# Patient Record
Sex: FEMALE | Race: OTHER | HISPANIC OR LATINO | Employment: UNEMPLOYED | URBAN - NONMETROPOLITAN AREA
[De-identification: names, ages, dates, MRNs, and addresses within clinical notes are randomized per-mention and may not be internally consistent; named-entity substitution may affect disease eponyms.]

---

## 2022-08-19 ENCOUNTER — HOSPITAL ENCOUNTER (EMERGENCY)
Facility: HOSPITAL | Age: 52
Discharge: HOME | End: 2022-08-19
Attending: STUDENT IN AN ORGANIZED HEALTH CARE EDUCATION/TRAINING PROGRAM | Admitting: STUDENT IN AN ORGANIZED HEALTH CARE EDUCATION/TRAINING PROGRAM

## 2022-08-19 VITALS
RESPIRATION RATE: 18 BRPM | DIASTOLIC BLOOD PRESSURE: 79 MMHG | HEART RATE: 74 BPM | SYSTOLIC BLOOD PRESSURE: 151 MMHG | OXYGEN SATURATION: 97 % | TEMPERATURE: 97.2 F

## 2022-08-19 DIAGNOSIS — M54.2 NECK PAIN: Primary | ICD-10-CM

## 2022-08-19 DIAGNOSIS — M62.838 NECK MUSCLE SPASM: ICD-10-CM

## 2022-08-19 PROCEDURE — 99282 EMERGENCY DEPT VISIT SF MDM: CPT

## 2022-08-19 PROCEDURE — 99284 EMERGENCY DEPT VISIT MOD MDM: CPT | Performed by: STUDENT IN AN ORGANIZED HEALTH CARE EDUCATION/TRAINING PROGRAM

## 2022-08-19 RX ORDER — TIZANIDINE HYDROCHLORIDE 2 MG/1
2 CAPSULE, GELATIN COATED ORAL 3 TIMES DAILY
Qty: 20 CAPSULE | Refills: 0 | Status: SHIPPED | OUTPATIENT
Start: 2022-08-19

## 2022-08-19 RX ORDER — NAPROXEN 500 MG/1
500 TABLET ORAL 2 TIMES DAILY WITH MEALS
Qty: 30 TABLET | Refills: 0 | Status: SHIPPED | OUTPATIENT
Start: 2022-08-19

## 2022-08-19 NOTE — ED PROVIDER NOTES
History  Chief Complaint   Patient presents with    Neck Pain     Pt stated she has neck and shoulder pain that radiates down her right arm starting 3-4 days ago  She took advil today for the pain  HPI this is a 70-year-old female presents the emergency department for 3 days progressively worsening right neck/right shoulder pain  Patient states the pain is worse with movement of the right shoulder and also has occasional radiculopathy down the right arm  She denies any trauma denies any falls  Denies having any similar episodes to this in the past   She denies any active chest pain or shortness of breath  States he does do a lot of housework at home which he thinks may have exacerbated the pain  She reports the area is quite tender to touch  She has some difficulty moving her neck secondary to pain  None       History reviewed  No pertinent past medical history  Past Surgical History:   Procedure Laterality Date    OVARY SURGERY      2005       History reviewed  No pertinent family history  I have reviewed and agree with the history as documented  E-Cigarette/Vaping     E-Cigarette/Vaping Substances     Social History     Tobacco Use    Smoking status: Never Smoker   Substance Use Topics    Alcohol use: Never    Drug use: Never       Review of Systems   Constitutional: Negative for activity change, appetite change, chills, fatigue and fever  HENT: Negative for congestion, dental problem, drooling, ear discharge, ear pain, facial swelling, postnasal drip, rhinorrhea and sinus pain  Eyes: Negative for photophobia, pain, discharge and itching  Respiratory: Negative for apnea, cough, chest tightness and shortness of breath  Cardiovascular: Negative for chest pain and leg swelling  Gastrointestinal: Negative for abdominal distention, abdominal pain, anal bleeding, constipation, diarrhea and nausea  Endocrine: Negative for cold intolerance, heat intolerance and polydipsia  Genitourinary: Negative for difficulty urinating  Musculoskeletal: Positive for neck pain  Negative for arthralgias, gait problem, joint swelling and myalgias  Skin: Negative for color change and pallor  Allergic/Immunologic: Negative for immunocompromised state  Neurological: Negative for dizziness, seizures, facial asymmetry, weakness, light-headedness, numbness and headaches  Psychiatric/Behavioral: Negative for agitation, behavioral problems, confusion, decreased concentration and dysphoric mood  All other systems reviewed and are negative  Physical Exam  Physical Exam  Vitals and nursing note reviewed  Constitutional:       General: She is not in acute distress  Appearance: She is well-developed  HENT:      Head: Normocephalic and atraumatic  Eyes:      Conjunctiva/sclera: Conjunctivae normal       Pupils: Pupils are equal, round, and reactive to light  Cardiovascular:      Rate and Rhythm: Normal rate and regular rhythm  Heart sounds: Normal heart sounds  No murmur heard  No friction rub  Pulmonary:      Effort: Pulmonary effort is normal       Breath sounds: Normal breath sounds  Abdominal:      General: Bowel sounds are normal       Palpations: Abdomen is soft  Musculoskeletal:         General: Normal range of motion  Cervical back: Normal range of motion and neck supple  Comments: Tenderness to palpation along the right trapezius as well as the right middle deltoid muscle  Normal range of motion of right shoulder however there is some reproducible pain with movement of the right shoulder  No step-offs or obvious deformities of the cervical spine  Skin:     General: Skin is warm  Capillary Refill: Capillary refill takes less than 2 seconds  Neurological:      Mental Status: She is alert and oriented to person, place, and time  Motor: No abnormal muscle tone        Coordination: Coordination normal    Psychiatric:         Behavior: Behavior normal          Thought Content: Thought content normal          Vital Signs  ED Triage Vitals [08/19/22 1052]   Temperature Pulse Respirations Blood Pressure SpO2   (!) 97 2 °F (36 2 °C) 74 18 151/79 97 %      Temp src Heart Rate Source Patient Position - Orthostatic VS BP Location FiO2 (%)   -- Monitor Sitting Left arm --      Pain Score       --           Vitals:    08/19/22 1052   BP: 151/79   Pulse: 74   Patient Position - Orthostatic VS: Sitting         Visual Acuity      ED Medications  Medications - No data to display    Diagnostic Studies  Results Reviewed     None                 No orders to display              Procedures  Procedures         ED Course                                             MDM  Number of Diagnoses or Management Options  Neck muscle spasm: new and does not require workup  Neck pain: new and does not require workup     Amount and/or Complexity of Data Reviewed  Clinical lab tests: reviewed  Tests in the radiology section of CPT®: reviewed  Tests in the medicine section of CPT®: reviewed    Risk of Complications, Morbidity, and/or Mortality  Presenting problems: low  Diagnostic procedures: low  Management options: low        Disposition  Final diagnoses:   Neck pain   Neck muscle spasm     Time reflects when diagnosis was documented in both MDM as applicable and the Disposition within this note     Time User Action Codes Description Comment    8/19/2022 10:57 AM Lisa Myles [M54 [id number]] Neck pain     8/19/2022 10:57 AM Tegan Easley [[id number]] Neck muscle spasm       ED Disposition     ED Disposition   Discharge    Condition   Stable    Date/Time   Fri Aug 19, 2022 10:57 AM    Comment   Tess Patel discharge to home/self care                 Follow-up Information    None         Patient's Medications   Discharge Prescriptions    NAPROXEN (NAPROSYN) 500 MG TABLET    Take 1 tablet (500 mg total) by mouth 2 (two) times a day with meals       Start Date: 8/19/2022 End Date: --       Order Dose: 500 mg       Quantity: 30 tablet    Refills: 0    TIZANIDINE (ZANAFLEX) 2 MG CAPSULE    Take 1 capsule (2 mg total) by mouth 3 (three) times a day       Start Date: 8/19/2022 End Date: --       Order Dose: 2 mg       Quantity: 20 capsule    Refills: 0       No discharge procedures on file      PDMP Review     None          ED Provider  Electronically Signed by           Abilio Buck MD  08/19/22 1032

## 2024-08-02 ENCOUNTER — APPOINTMENT (EMERGENCY)
Dept: RADIOLOGY | Facility: HOSPITAL | Age: 54
End: 2024-08-02

## 2024-08-02 ENCOUNTER — HOSPITAL ENCOUNTER (EMERGENCY)
Facility: HOSPITAL | Age: 54
Discharge: HOME | End: 2024-08-02
Attending: EMERGENCY MEDICINE

## 2024-08-02 VITALS
RESPIRATION RATE: 18 BRPM | DIASTOLIC BLOOD PRESSURE: 65 MMHG | SYSTOLIC BLOOD PRESSURE: 133 MMHG | HEART RATE: 60 BPM | TEMPERATURE: 97.4 F | OXYGEN SATURATION: 97 %

## 2024-08-02 DIAGNOSIS — J06.9 URI (UPPER RESPIRATORY INFECTION): Primary | ICD-10-CM

## 2024-08-02 DIAGNOSIS — R79.89 ELEVATED TROPONIN: ICD-10-CM

## 2024-08-02 DIAGNOSIS — Z53.29 LEFT AGAINST MEDICAL ADVICE: ICD-10-CM

## 2024-08-02 LAB
2HR DELTA HS TROPONIN: 49 NG/L
4HR DELTA HS TROPONIN: 36 NG/L
ALBUMIN SERPL BCG-MCNC: 4 G/DL (ref 3.5–5)
ALP SERPL-CCNC: 79 U/L (ref 34–104)
ALT SERPL W P-5'-P-CCNC: 31 U/L (ref 7–52)
ANION GAP SERPL CALCULATED.3IONS-SCNC: 9 MMOL/L (ref 4–13)
AST SERPL W P-5'-P-CCNC: 19 U/L (ref 13–39)
BACTERIA UR QL AUTO: ABNORMAL /HPF
BASOPHILS # BLD AUTO: 0.03 THOUSANDS/ÂΜL (ref 0–0.1)
BASOPHILS NFR BLD AUTO: 0 % (ref 0–1)
BILIRUB SERPL-MCNC: 0.73 MG/DL (ref 0.2–1)
BILIRUB UR QL STRIP: NEGATIVE
BNP SERPL-MCNC: 70 PG/ML (ref 0–100)
BUN SERPL-MCNC: 14 MG/DL (ref 5–25)
CALCIUM SERPL-MCNC: 9.5 MG/DL (ref 8.4–10.2)
CARDIAC TROPONIN I PNL SERPL HS: 16 NG/L
CARDIAC TROPONIN I PNL SERPL HS: 52 NG/L
CARDIAC TROPONIN I PNL SERPL HS: 65 NG/L
CARDIAC TROPONIN I PNL SERPL HS: 70 NG/L (ref 8–18)
CHLORIDE SERPL-SCNC: 107 MMOL/L (ref 96–108)
CLARITY UR: CLEAR
CO2 SERPL-SCNC: 24 MMOL/L (ref 21–32)
COLOR UR: ABNORMAL
CREAT SERPL-MCNC: 0.57 MG/DL (ref 0.6–1.3)
D DIMER PPP FEU-MCNC: 0.36 UG/ML FEU
EOSINOPHIL # BLD AUTO: 0.12 THOUSAND/ÂΜL (ref 0–0.61)
EOSINOPHIL NFR BLD AUTO: 1 % (ref 0–6)
ERYTHROCYTE [DISTWIDTH] IN BLOOD BY AUTOMATED COUNT: 11.8 % (ref 11.6–15.1)
FLUAV RNA RESP QL NAA+PROBE: NEGATIVE
FLUBV RNA RESP QL NAA+PROBE: NEGATIVE
GFR SERPL CREATININE-BSD FRML MDRD: 106 ML/MIN/1.73SQ M
GLUCOSE SERPL-MCNC: 125 MG/DL (ref 65–140)
GLUCOSE UR STRIP-MCNC: NEGATIVE MG/DL
HCT VFR BLD AUTO: 38.4 % (ref 34.8–46.1)
HGB BLD-MCNC: 12.8 G/DL (ref 11.5–15.4)
HGB UR QL STRIP.AUTO: ABNORMAL
IMM GRANULOCYTES # BLD AUTO: 0.03 THOUSAND/UL (ref 0–0.2)
IMM GRANULOCYTES NFR BLD AUTO: 0 % (ref 0–2)
KETONES UR STRIP-MCNC: NEGATIVE MG/DL
LEUKOCYTE ESTERASE UR QL STRIP: NEGATIVE
LYMPHOCYTES # BLD AUTO: 1.77 THOUSANDS/ÂΜL (ref 0.6–4.47)
LYMPHOCYTES NFR BLD AUTO: 16 % (ref 14–44)
MCH RBC QN AUTO: 31.8 PG (ref 26.8–34.3)
MCHC RBC AUTO-ENTMCNC: 33.3 G/DL (ref 31.4–37.4)
MCV RBC AUTO: 95 FL (ref 82–98)
MONOCYTES # BLD AUTO: 0.62 THOUSAND/ÂΜL (ref 0.17–1.22)
MONOCYTES NFR BLD AUTO: 6 % (ref 4–12)
NEUTROPHILS # BLD AUTO: 8.29 THOUSANDS/ÂΜL (ref 1.85–7.62)
NEUTS SEG NFR BLD AUTO: 77 % (ref 43–75)
NITRITE UR QL STRIP: NEGATIVE
NON-SQ EPI CELLS URNS QL MICRO: ABNORMAL /HPF
NRBC BLD AUTO-RTO: 0 /100 WBCS
PH UR STRIP.AUTO: 7 [PH]
PLATELET # BLD AUTO: 177 THOUSANDS/UL (ref 149–390)
PMV BLD AUTO: 11.1 FL (ref 8.9–12.7)
POTASSIUM SERPL-SCNC: 3.6 MMOL/L (ref 3.5–5.3)
PROT SERPL-MCNC: 6.6 G/DL (ref 6.4–8.4)
PROT UR STRIP-MCNC: NEGATIVE MG/DL
RBC # BLD AUTO: 4.03 MILLION/UL (ref 3.81–5.12)
RBC #/AREA URNS AUTO: ABNORMAL /HPF
RSV RNA RESP QL NAA+PROBE: NEGATIVE
S PYO DNA THROAT QL NAA+PROBE: NOT DETECTED
SARS-COV-2 RNA RESP QL NAA+PROBE: NEGATIVE
SODIUM SERPL-SCNC: 140 MMOL/L (ref 135–147)
SP GR UR STRIP.AUTO: 1.02 (ref 1–1.03)
UROBILINOGEN UR STRIP-ACNC: <2 MG/DL
WBC # BLD AUTO: 10.86 THOUSAND/UL (ref 4.31–10.16)
WBC #/AREA URNS AUTO: ABNORMAL /HPF

## 2024-08-02 PROCEDURE — 0241U HB NFCT DS VIR RESP RNA 4 TRGT: CPT | Performed by: PHYSICIAN ASSISTANT

## 2024-08-02 PROCEDURE — 83880 ASSAY OF NATRIURETIC PEPTIDE: CPT | Performed by: PHYSICIAN ASSISTANT

## 2024-08-02 PROCEDURE — 84484 ASSAY OF TROPONIN QUANT: CPT | Performed by: PHYSICIAN ASSISTANT

## 2024-08-02 PROCEDURE — 80053 COMPREHEN METABOLIC PANEL: CPT | Performed by: PHYSICIAN ASSISTANT

## 2024-08-02 PROCEDURE — 99285 EMERGENCY DEPT VISIT HI MDM: CPT | Performed by: PHYSICIAN ASSISTANT

## 2024-08-02 PROCEDURE — 36415 COLL VENOUS BLD VENIPUNCTURE: CPT | Performed by: PHYSICIAN ASSISTANT

## 2024-08-02 PROCEDURE — 96374 THER/PROPH/DIAG INJ IV PUSH: CPT

## 2024-08-02 PROCEDURE — 85025 COMPLETE CBC W/AUTO DIFF WBC: CPT | Performed by: PHYSICIAN ASSISTANT

## 2024-08-02 PROCEDURE — 96375 TX/PRO/DX INJ NEW DRUG ADDON: CPT

## 2024-08-02 PROCEDURE — 81001 URINALYSIS AUTO W/SCOPE: CPT | Performed by: PHYSICIAN ASSISTANT

## 2024-08-02 PROCEDURE — 99284 EMERGENCY DEPT VISIT MOD MDM: CPT

## 2024-08-02 PROCEDURE — 85379 FIBRIN DEGRADATION QUANT: CPT | Performed by: PHYSICIAN ASSISTANT

## 2024-08-02 PROCEDURE — 71045 X-RAY EXAM CHEST 1 VIEW: CPT

## 2024-08-02 PROCEDURE — 93005 ELECTROCARDIOGRAM TRACING: CPT

## 2024-08-02 PROCEDURE — 87651 STREP A DNA AMP PROBE: CPT | Performed by: PHYSICIAN ASSISTANT

## 2024-08-02 RX ORDER — METHYLPREDNISOLONE SODIUM SUCCINATE 125 MG/2ML
125 INJECTION, POWDER, LYOPHILIZED, FOR SOLUTION INTRAMUSCULAR; INTRAVENOUS ONCE
Status: COMPLETED | OUTPATIENT
Start: 2024-08-02 | End: 2024-08-02

## 2024-08-02 RX ORDER — ONDANSETRON 2 MG/ML
4 INJECTION INTRAMUSCULAR; INTRAVENOUS ONCE
Status: COMPLETED | OUTPATIENT
Start: 2024-08-02 | End: 2024-08-02

## 2024-08-02 RX ORDER — PREDNISONE 20 MG/1
40 TABLET ORAL DAILY
Qty: 10 TABLET | Refills: 0 | Status: SHIPPED | OUTPATIENT
Start: 2024-08-02 | End: 2024-08-07

## 2024-08-02 RX ORDER — DIPHENHYDRAMINE HYDROCHLORIDE 50 MG/ML
25 INJECTION INTRAMUSCULAR; INTRAVENOUS ONCE
Status: COMPLETED | OUTPATIENT
Start: 2024-08-02 | End: 2024-08-02

## 2024-08-02 RX ORDER — AZITHROMYCIN 250 MG/1
TABLET, FILM COATED ORAL
Qty: 6 TABLET | Refills: 0 | Status: SHIPPED | OUTPATIENT
Start: 2024-08-02 | End: 2024-08-06

## 2024-08-02 RX ADMIN — ONDANSETRON 4 MG: 2 INJECTION INTRAMUSCULAR; INTRAVENOUS at 14:17

## 2024-08-02 RX ADMIN — DIPHENHYDRAMINE HYDROCHLORIDE 25 MG: 50 INJECTION, SOLUTION INTRAMUSCULAR; INTRAVENOUS at 14:17

## 2024-08-02 RX ADMIN — METHYLPREDNISOLONE SODIUM SUCCINATE 125 MG: 125 INJECTION, POWDER, FOR SOLUTION INTRAMUSCULAR; INTRAVENOUS at 14:17

## 2024-08-02 NOTE — ED PROVIDER NOTES
History  Chief Complaint   Patient presents with    Allergic Reaction     Per , has not been feeling well, with cough and sore throat. Was given keflex from a family member took a dose yesterday and today. Started with throat tightness today after taking dose of keflex. Some shortness of breath      54 year old female with no significant reported PMH presenting with significant other for evaluation of possible allergic reaction.  Pt reports she has been sick over the past week with sinus congestion, cough, sore throat, body aches, fatigue.  No reported fevers.  She has had post nasal drip.  Denies chest pain.  Reports nausea.  Denies V/D, abdominal pain.  She felt that she had an infection and got a prescription of leftover keflex from a relative.  Took a dose last night and took another dose today.  Today she began feeling more short of breath.  No noted wheezing.  No h/o asthma or underlying respiratory disease.  No hives or rashes.  No reported itching.  No difficultly swallowing or speaking.  No reported aggravating or alleviating factors.  Also took a dose of ibuprofen 800 mg prior to arrival.  No significant reported PMH.  No reported prescription home medications other than keflex she took.  She has penicillin allergy, otherwise no known allergens.      History provided by:  Patient, medical records and spouse   used: Yes    Allergic Reaction  Presenting symptoms: difficulty breathing    Presenting symptoms: no difficulty swallowing, no itching, no rash, no swelling and no wheezing    Context: medications    Context: not new detergents/soaps    Relieved by:  Nothing  Worsened by:  Nothing  Ineffective treatments:  None tried      Prior to Admission Medications   Prescriptions Last Dose Informant Patient Reported? Taking?   TiZANidine (Zanaflex) 2 MG capsule   No No   Sig: Take 1 capsule (2 mg total) by mouth 3 (three) times a day   naproxen (Naprosyn) 500 mg tablet   No No   Sig:  Take 1 tablet (500 mg total) by mouth 2 (two) times a day with meals      Facility-Administered Medications: None       History reviewed. No pertinent past medical history.    Past Surgical History:   Procedure Laterality Date    OVARY SURGERY      2005       History reviewed. No pertinent family history.  I have reviewed and agree with the history as documented.    E-Cigarette/Vaping     E-Cigarette/Vaping Substances     Social History     Tobacco Use    Smoking status: Never   Substance Use Topics    Alcohol use: Never    Drug use: Never       Review of Systems   Constitutional:  Positive for fatigue. Negative for chills and fever.   HENT:  Positive for congestion, ear pain, postnasal drip, rhinorrhea and sore throat. Negative for trouble swallowing and voice change.    Eyes: Negative.  Negative for visual disturbance.   Respiratory:  Positive for cough and shortness of breath. Negative for wheezing.    Cardiovascular: Negative.  Negative for chest pain, palpitations and leg swelling.   Gastrointestinal:  Positive for nausea. Negative for abdominal pain, constipation, diarrhea and vomiting.   Genitourinary: Negative.  Negative for dysuria, flank pain, frequency and hematuria.   Musculoskeletal:  Positive for myalgias. Negative for back pain and neck pain.   Skin: Negative.  Negative for itching and rash.   Neurological:  Positive for light-headedness and headaches. Negative for dizziness, syncope and numbness.   Psychiatric/Behavioral: Negative.  Negative for confusion.    All other systems reviewed and are negative.      Physical Exam  Physical Exam  Vitals and nursing note reviewed.   Constitutional:       General: She is awake. She is not in acute distress.     Appearance: She is well-developed and normal weight. She is not toxic-appearing or diaphoretic.   HENT:      Head: Normocephalic and atraumatic.      Right Ear: Hearing, tympanic membrane, ear canal and external ear normal.      Left Ear: Hearing,  tympanic membrane, ear canal and external ear normal.      Nose: Congestion present.      Mouth/Throat:      Lips: Pink.      Mouth: Mucous membranes are normal. Mucous membranes are moist. No oral lesions or angioedema.      Tongue: Tongue does not deviate from midline.      Pharynx: Oropharynx is clear. Uvula midline. Posterior oropharyngeal erythema present. No pharyngeal swelling or oropharyngeal exudate.      Tonsils: No tonsillar exudate or tonsillar abscesses.   Eyes:      General: Lids are normal. No scleral icterus.     Extraocular Movements: Extraocular movements intact and EOM normal.      Conjunctiva/sclera: Conjunctivae normal.      Pupils: Pupils are equal, round, and reactive to light.   Neck:      Trachea: Trachea and phonation normal. No tracheal deviation.   Cardiovascular:      Rate and Rhythm: Normal rate and regular rhythm.      Pulses: Normal pulses.           Radial pulses are 2+ on the right side and 2+ on the left side.        Dorsalis pedis pulses are 2+ on the right side and 2+ on the left side.        Posterior tibial pulses are 2+ on the right side and 2+ on the left side.      Heart sounds: Normal heart sounds, S1 normal and S2 normal. No murmur heard.  Pulmonary:      Effort: Pulmonary effort is normal. No tachypnea or respiratory distress.      Breath sounds: Normal breath sounds. No wheezing, rhonchi or rales.   Abdominal:      General: Bowel sounds are normal. There is no distension.      Palpations: Abdomen is soft.      Tenderness: There is no abdominal tenderness. There is no CVA tenderness, guarding or rebound.   Musculoskeletal:         General: No edema.      Cervical back: Normal range of motion and neck supple.      Right lower leg: No edema.      Left lower leg: No edema.   Skin:     General: Skin is warm and dry.      Capillary Refill: Capillary refill takes less than 2 seconds.      Findings: No rash.   Neurological:      General: No focal deficit present.      Mental  Status: She is alert and oriented to person, place, and time.      GCS: GCS eye subscore is 4. GCS verbal subscore is 5. GCS motor subscore is 6.      Cranial Nerves: No cranial nerve deficit.      Sensory: Sensation is intact. No sensory deficit.      Motor: Motor function is intact. No weakness or abnormal muscle tone.   Psychiatric:         Mood and Affect: Mood and affect and mood normal.         Speech: Speech normal.         Behavior: Behavior normal. Behavior is cooperative.         Vital Signs  ED Triage Vitals [08/02/24 1353]   Temperature Pulse Respirations Blood Pressure SpO2   (!) 97.4 °F (36.3 °C) 59 18 163/74 99 %      Temp Source Heart Rate Source Patient Position - Orthostatic VS BP Location FiO2 (%)   Temporal Monitor Sitting Right arm --      Pain Score       --           Vitals:    08/02/24 1600 08/02/24 1700 08/02/24 1800 08/02/24 1900   BP: 122/58 141/87 137/66 133/65   Pulse: 56 75 56 60   Patient Position - Orthostatic VS: Sitting Sitting Sitting Sitting         Visual Acuity      ED Medications  Medications   methylPREDNISolone sodium succinate (Solu-MEDROL) injection 125 mg (125 mg Intravenous Given 8/2/24 1417)   diphenhydrAMINE (BENADRYL) injection 25 mg (25 mg Intravenous Given 8/2/24 1417)   ondansetron (ZOFRAN) injection 4 mg (4 mg Intravenous Given 8/2/24 1417)       Diagnostic Studies  Results Reviewed       Procedure Component Value Units Date/Time    B-Type Natriuretic Peptide(BNP) [[id number]]  (Normal) Collected: 08/02/24 1417    Lab Status: Final result Specimen: Blood from Arm, Left Updated: 08/02/24 1924     BNP 70 pg/mL     D-Dimer [[id number]]  (Normal) Collected: 08/02/24 1901    Lab Status: Final result Specimen: Blood from Arm, Left Updated: 08/02/24 1918     D-Dimer, Quant 0.36 ug/ml FEU     Narrative:      In the evaluation for possible pulmonary embolism, in the appropriate (Well's Score of 4 or less) patient, the age adjusted d-dimer cutoff for this patient can be  calculated as:    Age x 0.01 (in ug/mL) for Age-adjusted D-dimer exclusion threshold for a patient over 50 years.    HS Troponin I 4hr [[id number]]  (Abnormal) Collected: 08/02/24 1835    Lab Status: Final result Specimen: Blood from Arm, Left Updated: 08/02/24 1913     hs TnI 4hr 52 ng/L      Delta 4hr hsTnI 36 ng/L     High Sensitivity Troponin I Random [[id number]]  (Abnormal) Collected: 08/02/24 1644    Lab Status: Final result Specimen: Blood from Hand, Right Updated: 08/02/24 1731     HS TnI random 70 ng/L     Urine Microscopic [[id number]]  (Abnormal) Collected: 08/02/24 1651    Lab Status: Final result Specimen: Urine, Clean Catch Updated: 08/02/24 1718     RBC, UA 4-10 /hpf      WBC, UA 1-2 /hpf      Epithelial Cells Occasional /hpf      Bacteria, UA Occasional /hpf     UA w Reflex to Microscopic w Reflex to Culture [[id number]]  (Abnormal) Collected: 08/02/24 1651    Lab Status: Final result Specimen: Urine, Clean Catch Updated: 08/02/24 1704     Color, UA Light Yellow     Clarity, UA Clear     Specific Gravity, UA 1.020     pH, UA 7.0     Leukocytes, UA Negative     Nitrite, UA Negative     Protein, UA Negative mg/dl      Glucose, UA Negative mg/dl      Ketones, UA Negative mg/dl      Urobilinogen, UA <2.0 mg/dl      Bilirubin, UA Negative     Occult Blood, UA Trace    HS Troponin I 2hr [[id number]]  (Abnormal) Collected: 08/02/24 1607    Lab Status: Final result Specimen: Blood from Arm, Left Updated: 08/02/24 1636     hs TnI 2hr 65 ng/L      Delta 2hr hsTnI 49 ng/L     FLU/RSV/COVID - if FLU/RSV clinically relevant [[id number]]  (Normal) Collected: 08/02/24 1417    Lab Status: Final result Specimen: Nares from Nose Updated: 08/02/24 1509     SARS-CoV-2 Negative     INFLUENZA A PCR Negative     INFLUENZA B PCR Negative     RSV PCR Negative    Narrative:      FOR PEDIATRIC PATIENTS - copy/paste COVID Guidelines URL to browser:  [email or web address]    SARS-CoV-2 assay is a Nucleic Acid Amplification assay intended for the  qualitative detection of nucleic acid from SARS-CoV-2 in nasopharyngeal  swabs. Results are for the presumptive identification of SARS-CoV-2 RNA.    Positive results are indicative of infection with SARS-CoV-2, the virus  causing COVID-19, but do not rule out bacterial infection or co-infection  with other viruses. Laboratories within the [place] and its  territories are required to report all positive results to the appropriate  public health authorities. Negative results do not preclude SARS-CoV-2  infection and should not be used as the sole basis for treatment or other  patient management decisions. Negative results must be combined with  clinical observations, patient history, and epidemiological information.  This test has not been FDA cleared or approved.    This test has been authorized by FDA under an Emergency Use Authorization  (EUA). This test is only authorized for the duration of time the  declaration that circumstances exist justifying the authorization of the  emergency use of an in vitro diagnostic tests for detection of SARS-CoV-2  virus and/or diagnosis of COVID-19 infection under section 564(b)(1) of  the Act, 21 U.S.C. 360bbb-3(b)(1), unless the authorization is terminated  or revoked sooner. The test has been validated but independent review by FDA  and CLIA is pending.    Test performed using [employer] GeneXpert: This RT-PCR assay targets N2,  a region unique to SARS-CoV-2. A conserved region in the E-gene was chosen  for pan-Sarbecovirus detection which includes SARS-CoV-2.    According to CMS-2020-01-R, this platform meets the definition of high-throughput technology.    Strep A PCR [[id number]]  (Normal) Collected: 08/02/24 1417    Lab Status: Final result Specimen: Throat Updated: 08/02/24 1456     STREP A PCR Not Detected    HS Troponin  0hr (reflex protocol) [[id number]]  (Normal) Collected: 08/02/24 1417    Lab Status: Final result Specimen: Blood from Arm, Left Updated: 08/02/24 1450     hs TnI 0hr 16 ng/L     Comprehensive metabolic panel [[id number]]  (Abnormal) Collected: 08/02/24 1417    Lab Status: Final result Specimen: Blood from Arm, Left Updated: 08/02/24 1444     Sodium 140 mmol/L      Potassium 3.6 mmol/L      Chloride 107 mmol/L      CO2 24 mmol/L      ANION GAP 9 mmol/L      BUN 14 mg/dL      Creatinine 0.57 mg/dL      Glucose 125 mg/dL      Calcium 9.5 mg/dL      AST 19 U/L      ALT 31 U/L      Alkaline Phosphatase 79 U/L      Total Protein 6.6 g/dL      Albumin 4.0 g/dL      Total Bilirubin 0.73 mg/dL      eGFR 106 ml/min/1.73sq m     Narrative:      National Kidney Disease Foundation guidelines for Chronic Kidney Disease (CKD):     Stage 1 with normal or high GFR (GFR > 90 mL/min/1.73 square meters)    Stage 2 Mild CKD (GFR = 60-89 mL/min/1.73 square meters)    Stage 3A Moderate CKD (GFR = 45-59 mL/min/1.73 square meters)    Stage 3B Moderate CKD (GFR = 30-44 mL/min/1.73 square meters)    Stage 4 Severe CKD (GFR = 15-29 mL/min/1.73 square meters)    Stage 5 End Stage CKD (GFR <15 mL/min/1.73 square meters)  Note: GFR calculation is accurate only with a steady state creatinine    CBC and differential [[id number]]  (Abnormal) Collected: 08/02/24 1417    Lab Status: Final result Specimen: Blood from Arm, Left Updated: 08/02/24 1429     WBC 10.86 Thousand/uL      RBC 4.03 Million/uL      Hemoglobin 12.8 g/dL      Hematocrit 38.4 %      MCV 95 fL      MCH 31.8 pg      MCHC 33.3 g/dL      RDW 11.8 %      MPV 11.1 fL      Platelets 177 Thousands/uL      nRBC 0 /100 WBCs      Segmented % 77 %      Immature Grans % 0 %      Lymphocytes % 16 %      Monocytes % 6 %      Eosinophils Relative 1 %      Basophils Relative 0 %      Absolute Neutrophils 8.29 Thousands/µL      Absolute Immature Grans 0.03 Thousand/uL      Absolute Lymphocytes 1.77  Thousands/µL      Absolute Monocytes 0.62 Thousand/µL      Eosinophils Absolute 0.12 Thousand/µL      Basophils Absolute 0.03 Thousands/µL                    XR chest 1 view portable   Final Result by Leidy Thurman MD (08/02 1916)      No acute cardiopulmonary disease.            Workstation performed: [id number]                    Procedures  ECG 12 Lead Documentation Only    Date/Time: 8/2/2024 2:22 PM    Performed by: Marilynn Apodaca PA-C  Authorized by: Marilynn Apodaca PA-C    Indications / Diagnosis:  Dyspnea  ECG reviewed by me, the ED Provider: yes    Patient location:  ED  Previous ECG:     Previous ECG:  Unavailable    Comparison to cardiac monitor: Yes    Interpretation:     Interpretation: normal    Rate:     ECG rate:  63    ECG rate assessment: normal    Rhythm:     Rhythm: sinus rhythm    Ectopy:     Ectopy: none    QRS:     QRS axis:  Normal    QRS intervals:  Normal  Conduction:     Conduction: normal    ST segments:     ST segments:  Normal  T waves:     T waves: normal    Comments:      , QRS 90, QT//386; no acute ischemic changes.  NSR with sinus arrhythmia.  No prior EKGs for comparison.  ECG 12 Lead Documentation Only    Date/Time: 8/2/2024 5:00 PM    Performed by: Marilynn Apodaca PA-C  Authorized by: Marilynn Apodaca PA-C    Indications / Diagnosis:  Repeat ekg  ECG reviewed by me, the ED Provider: yes    Patient location:  ED  Previous ECG:     Previous ECG:  Compared to current    Similarity:  No change    Comparison to cardiac monitor: Yes    Interpretation:     Interpretation: non-specific    Rate:     ECG rate:  63    ECG rate assessment: normal    Rhythm:     Rhythm: sinus rhythm    Ectopy:     Ectopy: none    QRS:     QRS axis:  Normal    QRS intervals:  Normal  Conduction:     Conduction: normal    ST segments:     ST segments:  Normal  T waves:     T waves: non-specific    Comments:      , QRS 90, QT//415; no acute ischemic changes.            ED Course  ED Course as of 08/02/24 2055   Fri Aug 02, 2024   1427 XR chest 1 view portable  Independently viewed and interpreted by me - no acute cardiopulmonary process; pending official read.   1430 WBC(!): 10.86  Mildly elevated; non specific   1430 Hemoglobin: 12.8   1430 Platelet Count: 177   1445 GLUCOSE: 125   1445 Creatinine(!): 0.57   1445 BUN: 14   1445 Sodium: 140   1445 Potassium: 3.6   1445 Chloride: 107   1445 Carbon Dioxide: 24   1445 ANION GAP: 9   1445 Calcium: 9.5   1445 AST: 19   1445 ALT: 31   1445 ALK PHOS: 79   1445 Total Protein: 6.6   1445 Albumin: 4.0   1445 Total Bilirubin: 0.73   1445 GFR, Calculated: 106   1450 hs TnI 0hr: 16  Not diagnostic of ACS, will require delta to exclude   1456 STREP A PCR: Not Detected   1517 SARS-COV-2: Negative   1517 INFLU A PCR: Negative   1517 INFLU B PCR: Negative   1517 RSV PCR: Negative   1600 Pt reassessed.  She reports feeling much improved.  Will await results of 2hr troponin, however low suspicion for ACS and anticipate discharge home with symptomatic management for sinusitis/bronchitis.  Would change keflex to alternative agent.   1637 Delta 2hr hsTnI(!): 49  Elevated, unclear significance.  Pt is not having any chest pain.  Will repeat EKG.  Will repeat value to ensure this is accurate.   1702 Repeat EKG non ischemic.   1710 Blood, UA(!): Trace  UA shows trace blood otherwise not suggest of infection   1732 HS TnI random(!): 70  A repeat value of 70, similar to 65 obtained; will plan on 4 hr troponin.  Pt and significant other agreeable to continued monitoring and discussed need for follow up value.   1914 hs TnI 4hr(!): 52  A repeat value at 52, actually decreased from values of 65 and 70 obtained earlier.  Still an increase from 19 initially.   1922 D-Dimer, Quant: 0.36  negative   1930 Had long discussion with pt and spouse.  She is not wanting to be admitted and wants to go home.  Her troponin value is stable abnormal but peaked and now  down trending.  She continues to deny chest pain, dyspnea.  Only continued complaint is sinus pressure and left earache.  Will have pt sign out against medical advice.   1939 BNP: 70               HEART Risk Score      Flowsheet Row Most Recent Value   Heart Score Risk Calculator    History 0 Filed at: 08/02/2024 1433   ECG 0 Filed at: 08/02/2024 1433   Age 1 Filed at: 08/02/2024 1433   Risk Factors 0 Filed at: 08/02/2024 1433   Troponin 2 Filed at: 08/02/2024 1433   HEART Score 3 Filed at: 08/02/2024 1433                          SBIRT 20yo+      Flowsheet Row Most Recent Value   Initial Alcohol Screen: US AUDIT-C     1. How often do you have a drink containing alcohol? 0 Filed at: 08/02/2024 1355   2. How many drinks containing alcohol do you have on a typical day you are drinking?  0 Filed at: 08/02/2024 1355   3b. FEMALE Any Age, or MALE 65+: How often do you have 4 or more drinks on one occassion? 0 Filed at: 08/02/2024 1355   Audit-C Score 0 Filed at: 08/02/2024 1355   JAMIE: How many times in the past year have you...    Used an illegal drug or used a prescription medication for non-medical reasons? Never Filed at: 08/02/2024 1355            Wells' Criteria for PE      Flowsheet Row Most Recent Value   Wells' Criteria for PE    Clinical signs and symptoms of DVT 0 Filed at: 08/02/2024 1906   PE is primary diagnosis or equally likely 0 Filed at: 08/02/2024 1906   HR >100 0 Filed at: 08/02/2024 1906   Immobilization at least 3 days or Surgery in the previous 4 weeks 0 Filed at: 08/02/2024 1906   Previous, objectively diagnosed PE or DVT 0 Filed at: 08/02/2024 1906   Hemoptysis 0 Filed at: 08/02/2024 1906   Malignancy with treatment within 6 months or palliative 0 Filed at: 08/02/2024 1906   Wells' Criteria Total 0 Filed at: 08/02/2024 1906                  Medical Decision Making  55 yo female presenting for evaluation of dyspnea.  It appears she has been sick with URI symptoms.  She also complains of sore  throat.  Will obtain EKG, CXR, labs, viral swab and strep screen.  Her lungs are clear, doubt pneumonia.  She recently starting taking an antibiotic that was not prescribed to her, concern for reaction.  She does complain of dyspnea but otherwise no findings of angioedema or anaphylaxis.  Lungs are clear.  Will continue to monitor.  Pt afebrile, hemodynamically stable.  Will provide dose of steroid and benadryl.      Work up obtained as noted above.  EKG non ischemic.  Delta troponins were obtained which showed elevation in subsequent value but did peak and go down at the 4 hr brannon.  EKG remained non ischemic and pt had no complaints of chest pain. CXR does not reveal pneumonia, pneumothorax, vascular congestion or pleural effusion.  Minimal non specific leukocytosis, no anemia.  No hypo or hyperglycemia.  Renal function within normal limits.  Electrolytes within normal limits.  UA not suggestive of infection.  D dimer negative, Wells' low risk, doubt PE.  BNP within normal limits, doubt CHF.  Covid/flu/rsv returned negative.  Strep testing returned negative.  Symptomatically pt felt improved after a dose of steroid and benadryl.  She continues to deny chest pain, dyspnea.  She was monitored for several hours and vitals remained stable.  She is well appearing.  We discussed admission to the hospital for further evaluation given troponin elevation which she ultimately declined.    This patient insists on leaving against medical advice, despite my recommendation to remain for ongoing evaluation and treatment.  The patient appears to understand the risks of doing so.  She is awake, alert and oriented.  She does not appear to be under the influence.  She appears capable of making her own medical decisions.  We discussed worsening/deterioration of condition, undiagnosed condition, permanent disability, cardiac arrest and death as a potential consequence of leaving at this time.  I have discussed the patient's plan for  follow-up care and urged an immediate arrangement of follow-up.  I have emphasized that the patient can always return to the emergency department at any time for further evaluation and treatment.  Pt and significant other voiced understanding and had no further questions. Pt left AMA in stable condition.  I did advised recheck with PCP.  We discussed strict return precautions to return.  Will order stress echo.    Please refer to above ER course for further details/discussion.        Problems Addressed:  Elevated troponin: acute illness or injury  Left against medical advice: acute illness or injury  URI (upper respiratory infection): acute illness or injury    Amount and/or Complexity of Data Reviewed  Independent Historian: spouse  External Data Reviewed: notes.  Labs: ordered. Decision-making details documented in ED Course.  Radiology: ordered and independent interpretation performed. Decision-making details documented in ED Course.  ECG/medicine tests: ordered and independent interpretation performed. Decision-making details documented in ED Course.    Risk  OTC drugs.  Prescription drug management.  Decision regarding hospitalization.                 Disposition  Final diagnoses:   URI (upper respiratory infection)   Elevated troponin   Left against medical advice     Time reflects when diagnosis was documented in both MDM as applicable and the Disposition within this note       Time User Action Codes Description Comment    8/2/2024  7:43 PM Marilynn Apodaca [J06.9] URI (upper respiratory infection)     8/2/2024  7:43 PM Marilynn Apodaca [R79.89] Elevated troponin     8/2/2024  7:43 PM Marilynn Apodaca Add [Z53.29] Left against medical advice           ED Disposition       ED Disposition   AMA    Condition   --    Date/Time   Fri Aug 2, 2024 1942    Comment   Date: 8/2/2024  Patient: Tess Patel  Admitted: 8/2/2024  1:47 PM  Attending Provider: DO Tess Cloud  Amanda or her authorized caregiver has made the decision for the patient to leave the emergency department ag ainst the advice of her attending physician. She or her authorized caregiver has been informed and understands the inherent risks, including death.  She or her authorized caregiver has decided to accept the responsibility for this decision. Tess Minor ilia and all necessary parties have been advised that she may return for further evaluation or treatment. Her condition at time of discharge was stable.  Tess Patel had current vital signs as follows:  /65 (BP Location: R ight arm)   Pulse 60   Temp (!) 97.4 °F (36.3 °C) (Temporal)   Resp 18                Follow-up Information       Follow up With Specialties Details Why Contact Info Additional Information    [facility] [facility] Emergency Medicine  As needed [facility] [address] [facility] [address]  [phone] [facility] Emergency Department, [address], [place], [address]            Discharge Medication List as of 8/2/2024  7:47 PM        START taking these medications    Details   azithromycin (ZITHROMAX) 250 mg tablet Take 2 tablets today then 1 tablet daily x 4 days, Normal      predniSONE 20 mg tablet Take 2 tablets (40 mg total) by mouth daily for 5 days, Starting Fri 8/2/2024, Until Wed 8/7/2024, Normal           CONTINUE these medications which have NOT CHANGED    Details   naproxen (Naprosyn) 500 mg tablet Take 1 tablet (500 mg total) by mouth 2 (two) times a day with meals, Starting Fri 8/19/2022, Print      TiZANidine (Zanaflex) 2 MG capsule Take 1 capsule (2 mg total) by mouth 3 (three) times a day, Starting Fri 8/19/2022, Print             Outpatient Discharge Orders   Echo stress test, exercise   Standing Status: Future Standing Exp. Date: 08/02/28       PDMP Review       None            ED Provider  Electronically Signed by              Marilynn Apodaca PA-C  08/02/24 [id number]

## 2024-08-02 NOTE — DISCHARGE INSTRUCTIONS
We have advised admission which you have declined to further monitor you.  Do not take the keflex.  I have prescribed an alternative antibiotic and steroid for your respiratory symptoms.  I have placed an order for stress echo to further evaluate your heart.  If you develop any chest pain, trouble breathing, weakness, abdominal pain, vomiting or other concerns, return to ER for re-evaluation.

## 2024-08-05 LAB
ATRIAL RATE: 63 BPM
P AXIS: -14 DEGREES
PR INTERVAL: 168 MS
QRS AXIS: 88 DEGREES
QRSD INTERVAL: 90 MS
QT INTERVAL: 378 MS
QTC INTERVAL: 386 MS
T WAVE AXIS: 49 DEGREES
VENTRICULAR RATE: 63 BPM

## 2024-08-05 PROCEDURE — 93010 ELECTROCARDIOGRAM REPORT: CPT | Performed by: INTERNAL MEDICINE

## 2024-08-06 LAB
ATRIAL RATE: 63 BPM
P AXIS: 50 DEGREES
PR INTERVAL: 182 MS
QRS AXIS: 82 DEGREES
QRSD INTERVAL: 90 MS
QT INTERVAL: 406 MS
QTC INTERVAL: 415 MS
T WAVE AXIS: 22 DEGREES
VENTRICULAR RATE: 63 BPM

## 2024-08-06 PROCEDURE — 93010 ELECTROCARDIOGRAM REPORT: CPT | Performed by: INTERNAL MEDICINE
